# Patient Record
Sex: FEMALE | Race: WHITE
[De-identification: names, ages, dates, MRNs, and addresses within clinical notes are randomized per-mention and may not be internally consistent; named-entity substitution may affect disease eponyms.]

---

## 2018-01-11 ENCOUNTER — HOSPITAL ENCOUNTER (OUTPATIENT)
Dept: HOSPITAL 62 - OD | Age: 68
End: 2018-01-11
Attending: INTERNAL MEDICINE
Payer: MEDICARE

## 2018-01-11 DIAGNOSIS — R53.83: ICD-10-CM

## 2018-01-11 DIAGNOSIS — I10: Primary | ICD-10-CM

## 2018-01-11 DIAGNOSIS — E78.5: ICD-10-CM

## 2018-01-11 LAB
ADD MANUAL DIFF: NO
ALBUMIN SERPL-MCNC: 4.7 G/DL (ref 3.5–5)
ALP SERPL-CCNC: 101 U/L (ref 38–126)
ALT SERPL-CCNC: 45 U/L (ref 9–52)
ANION GAP SERPL CALC-SCNC: 17 MMOL/L (ref 5–19)
AST SERPL-CCNC: 43 U/L (ref 14–36)
BASOPHILS # BLD AUTO: 0.1 10^3/UL (ref 0–0.2)
BASOPHILS NFR BLD AUTO: 0.7 % (ref 0–2)
BILIRUB DIRECT SERPL-MCNC: 0.3 MG/DL (ref 0–0.4)
BILIRUB SERPL-MCNC: 0.7 MG/DL (ref 0.2–1.3)
BUN SERPL-MCNC: 22 MG/DL (ref 7–20)
CALCIUM: 10.7 MG/DL (ref 8.4–10.2)
CHLORIDE SERPL-SCNC: 99 MMOL/L (ref 98–107)
CHOLEST SERPL-MCNC: 252.92 MG/DL (ref 0–200)
CO2 SERPL-SCNC: 28 MMOL/L (ref 22–30)
EOSINOPHIL # BLD AUTO: 0.3 10^3/UL (ref 0–0.6)
EOSINOPHIL NFR BLD AUTO: 3.2 % (ref 0–6)
ERYTHROCYTE [DISTWIDTH] IN BLOOD BY AUTOMATED COUNT: 13.4 % (ref 11.5–14)
GLUCOSE SERPL-MCNC: 100 MG/DL (ref 75–110)
HCT VFR BLD CALC: 41.3 % (ref 36–47)
HGB BLD-MCNC: 14 G/DL (ref 12–15.5)
LDLC SERPL DIRECT ASSAY-MCNC: 149 MG/DL (ref ?–100)
LYMPHOCYTES # BLD AUTO: 2.4 10^3/UL (ref 0.5–4.7)
LYMPHOCYTES NFR BLD AUTO: 24.7 % (ref 13–45)
MCH RBC QN AUTO: 31 PG (ref 27–33.4)
MCHC RBC AUTO-ENTMCNC: 33.8 G/DL (ref 32–36)
MCV RBC AUTO: 92 FL (ref 80–97)
MONOCYTES # BLD AUTO: 0.6 10^3/UL (ref 0.1–1.4)
MONOCYTES NFR BLD AUTO: 5.9 % (ref 3–13)
NEUTROPHILS # BLD AUTO: 6.4 10^3/UL (ref 1.7–8.2)
NEUTS SEG NFR BLD AUTO: 65.5 % (ref 42–78)
PLATELET # BLD: 264 10^3/UL (ref 150–450)
POTASSIUM SERPL-SCNC: 4.2 MMOL/L (ref 3.6–5)
PROT SERPL-MCNC: 7.8 G/DL (ref 6.3–8.2)
RBC # BLD AUTO: 4.51 10^6/UL (ref 3.72–5.28)
SODIUM SERPL-SCNC: 144.2 MMOL/L (ref 137–145)
TOTAL CELLS COUNTED % (AUTO): 100 %
TRIGL SERPL-MCNC: 236 MG/DL (ref ?–150)
VLDLC SERPL CALC-MCNC: 47.2 MG/DL (ref 10–31)
WBC # BLD AUTO: 9.8 10^3/UL (ref 4–10.5)

## 2018-01-11 PROCEDURE — 85025 COMPLETE CBC W/AUTO DIFF WBC: CPT

## 2018-01-11 PROCEDURE — 80061 LIPID PANEL: CPT

## 2018-01-11 PROCEDURE — 36415 COLL VENOUS BLD VENIPUNCTURE: CPT

## 2018-01-11 PROCEDURE — 80053 COMPREHEN METABOLIC PANEL: CPT

## 2018-01-11 PROCEDURE — 84443 ASSAY THYROID STIM HORMONE: CPT

## 2018-01-26 ENCOUNTER — HOSPITAL ENCOUNTER (OUTPATIENT)
Dept: HOSPITAL 62 - RAD | Age: 68
End: 2018-01-26
Attending: INTERNAL MEDICINE
Payer: MEDICARE

## 2018-01-26 ENCOUNTER — HOSPITAL ENCOUNTER (OUTPATIENT)
Dept: HOSPITAL 62 - OD | Age: 68
End: 2018-01-26
Attending: INTERNAL MEDICINE
Payer: MEDICARE

## 2018-01-26 DIAGNOSIS — E83.51: Primary | ICD-10-CM

## 2018-01-26 DIAGNOSIS — R42: ICD-10-CM

## 2018-01-26 DIAGNOSIS — E21.3: Primary | ICD-10-CM

## 2018-01-26 DIAGNOSIS — H83.09: ICD-10-CM

## 2018-01-26 DIAGNOSIS — E88.81: ICD-10-CM

## 2018-01-26 PROCEDURE — 83970 ASSAY OF PARATHORMONE: CPT

## 2018-01-26 PROCEDURE — A9500 TC99M SESTAMIBI: HCPCS

## 2018-01-26 PROCEDURE — 36415 COLL VENOUS BLD VENIPUNCTURE: CPT

## 2018-01-26 PROCEDURE — 78070 PARATHYROID PLANAR IMAGING: CPT

## 2018-01-26 NOTE — RADIOLOGY REPORT (SQ)
EXAM DESCRIPTION:  NM PARATHYROID IMAGING



COMPLETED DATE/TIME:  1/26/2018 11:35 am



REASON FOR STUDY:  HYPERPARATHYROIDISM (E21.3) E21.3  HYPERPARATHYROIDISM, UNSPECIFIED



COMPARISON:  None.



RADIONUCLIDE AND DOSE:  18.56 millicuries Tc-99m Sestamibi.

The route of agent administration: Intravenous



ADDITIONAL DRUGS AND DOSES:  None.



TECHNIQUE:  Early and delayed images of the neck acquired following radionuclide administration.



LIMITATIONS:  None.



FINDINGS:  Thyroid: Normal size.  Homogeneous activity.  Normal washout.  No focal lesions.

Parathyroid: No retained activity in the thyroid or elsewhere in the neck to indicate a parathyroid a
denoma.

Other: No other significant findings.



IMPRESSION:  NORMAL STUDY.  NO EVIDENCE OF PARATHYROID ADENOMA.



TECHNICAL DOCUMENTATION:  JOB ID:  [id number]

 2011 [email or web address]- All Rights Reserved

## 2018-02-06 ENCOUNTER — HOSPITAL ENCOUNTER (OUTPATIENT)
Dept: HOSPITAL 62 - OD | Age: 68
End: 2018-02-06
Attending: FAMILY MEDICINE
Payer: MEDICARE

## 2018-02-06 DIAGNOSIS — E21.3: ICD-10-CM

## 2018-02-06 DIAGNOSIS — E55.9: Primary | ICD-10-CM

## 2018-02-06 PROCEDURE — 82306 VITAMIN D 25 HYDROXY: CPT

## 2018-02-06 PROCEDURE — 36415 COLL VENOUS BLD VENIPUNCTURE: CPT

## 2018-10-10 ENCOUNTER — HOSPITAL ENCOUNTER (OUTPATIENT)
Dept: HOSPITAL 62 - RAD | Age: 68
End: 2018-10-10
Attending: INTERNAL MEDICINE
Payer: MEDICARE

## 2018-10-10 DIAGNOSIS — I10: ICD-10-CM

## 2018-10-10 DIAGNOSIS — R07.9: Primary | ICD-10-CM

## 2018-10-10 DIAGNOSIS — E11.9: ICD-10-CM

## 2018-10-10 DIAGNOSIS — R06.09: ICD-10-CM

## 2018-10-10 PROCEDURE — A9500 TC99M SESTAMIBI: HCPCS

## 2018-10-10 PROCEDURE — 93017 CV STRESS TEST TRACING ONLY: CPT

## 2018-10-10 PROCEDURE — 78452 HT MUSCLE IMAGE SPECT MULT: CPT

## 2018-10-10 NOTE — DRAGON STRESS TEST REPORT
INTRAVENOUS LEXISCAN CARDIOLITE STRESS TEST USING SINGLE PHOTON EMMISION 
COMPUTERIZED TOMOGRAPHIC.



DATE OF PROCEDURE: October 10, 2018, 

INDICATION : Chest pain



CARDIAC RISK FACTORS: Diabetes, hypertension



RESTING EKG: Sinus rhythm without any significant baseline ST-T wave changes

STRESS EKG: No significant ST segment changes noted with LexiScan bolus 



REASON FOR TERMINATION: Protocol.





PROCEDURE REPORT: Baseline heart rate 74 beats per minute with blood pressure 
of 157/92.  Patient had no significant complaints.  

Patient was bolused with Lexiscan 0.4 mg intravenously followed by saline bolus.

Heart rate at 2 minutes post bolus 94 with a blood pressure of 183/88.  

3 minutes post bolus heart rate 96 with blood pressure of 181/87.  

No significant EKG changes were noted.  Patient had no significant complaints 
during the procedure or postprocedure. 





CONCLUSIONS: Normal EKG and hemodynamic response to IV LexiScan.







NUCLEAR DATA: 

At rest the patient was given 14.61 millicuries of technetium 99 sestamibi 
injected intravenously.  As per protocol rest gated SPECT images were obtained.
  

On day of stress test, the patient was given intravenous LexiScan at a dose of 
0.4 mg in 5 mL intravenously, followed by flush with normal saline.  
Subsequently the stress dose of 42.3 millicuries of technetium 99 sestamibi was 
injected intravenously.  As per protocol stress gated images were obtained.  



NUCLEAR INTERPRETATION: Both raw and processed data were used for 
interpretation.  Visual, qualitative, computer-generated quantitative data was 
used.  There was good myocardial uptake of technetium compound.  Motion 
artifact and soft tissue attenuations were noted.  Increased visceral uptake 
was noted.  Significant breast attenuation artifact was noted.  A small area of 
mild decreased uptake noted in the mid anterior wall, felt to be related to 
differences in breast attenuation artifact rather than to defect but clinical 
correlation requested.  Therefore no definitive areas of transient perfusion 
defect noted, No definitive areas of fixed perfusion defect or scars noted.



EKG gated imaging showed LV EF at 65 %, rest and stress gated EF similar 
visually.  T. I D. ratio was 1.13.  Lung heart ratio noted to be within normal 
limits 0.21.  No significant extracardiac and abnormal radiotracer activities 
were noted.  RV free wall uptake was noted to be WNL.



IMPRESSION: Also refer to comments under nuclear interpretation. Also test 
results needs to be interpreted in the context of pretest probability.





1.  No definitive areas of transient perfusion defect noted.  A small area of 
mild decreased uptake in the stress imaging was noted in the mid anterior wall, 
SDS of 1, cannot rule out mild ischemia but felt to be mostly artifactual.  May 
consider stress echo if clinically indicated.  Since SDS score is 1, overall 
risk is low.

2.  There is no definitive scintigraphic evidence of myocardial infarction/scar.

3.  EKG gated imaging shows left ventricular ejection fraction of approx. 65 %. 

4.  Clinical correlation requested as worse disease and or balanced ischemia 
could be missed. In approximately 10% of the cases Lexiscan may not cause 
adequate vasodilatory stress.



RECOMMENDATIONS: 

Aggressive risk factor modification and medical management.  Further evaluation 
may be needed if continued symptoms or other high risk indicators are noted on 
clinical evaluation.

Close cardiology follow-up is also recommended.

Clinical correlation with echocardiogram derived ejection fraction.

Inability to exercise by itself can lead to increased cardiovascular event 
risks.

Consider cardiology consultation and or follow-up if clinically indicated.

I am available for cardiology evaluation and consultation if requested by the 
primary care MD, unless patient already has a cardiologist.





Dr. ELIANE Sood.  MD [facility]

Board certified in cardiology and sleep medicine.

Board certified in nuclear cardiology, adult echocardiography.
RENETTA

## 2020-02-24 ENCOUNTER — HOSPITAL ENCOUNTER (OUTPATIENT)
Dept: HOSPITAL 62 - OD | Age: 70
End: 2020-02-24
Attending: INTERNAL MEDICINE
Payer: MEDICARE

## 2020-02-24 DIAGNOSIS — E78.5: ICD-10-CM

## 2020-02-24 DIAGNOSIS — N18.2: ICD-10-CM

## 2020-02-24 DIAGNOSIS — R73.9: ICD-10-CM

## 2020-02-24 DIAGNOSIS — I12.9: Primary | ICD-10-CM

## 2020-02-24 LAB
ADD MANUAL DIFF: NO
ALBUMIN SERPL-MCNC: 4.5 G/DL (ref 3.5–5)
ALP SERPL-CCNC: 86 U/L (ref 38–126)
ANION GAP SERPL CALC-SCNC: 10 MMOL/L (ref 5–19)
AST SERPL-CCNC: 38 U/L (ref 14–36)
BASOPHILS # BLD AUTO: 0 10^3/UL (ref 0–0.2)
BASOPHILS NFR BLD AUTO: 0.6 % (ref 0–2)
BILIRUB DIRECT SERPL-MCNC: 0.3 MG/DL (ref 0–0.4)
BILIRUB SERPL-MCNC: 0.7 MG/DL (ref 0.2–1.3)
BUN SERPL-MCNC: 25 MG/DL (ref 7–20)
CALCIUM: 9.7 MG/DL (ref 8.4–10.2)
CHLORIDE SERPL-SCNC: 98 MMOL/L (ref 98–107)
CHOLEST SERPL-MCNC: 208.34 MG/DL (ref 0–200)
CO2 SERPL-SCNC: 31 MMOL/L (ref 22–30)
EOSINOPHIL # BLD AUTO: 0.2 10^3/UL (ref 0–0.6)
EOSINOPHIL NFR BLD AUTO: 3.2 % (ref 0–6)
ERYTHROCYTE [DISTWIDTH] IN BLOOD BY AUTOMATED COUNT: 13.7 % (ref 11.5–14)
GLUCOSE SERPL-MCNC: 90 MG/DL (ref 75–110)
HCT VFR BLD CALC: 40.7 % (ref 36–47)
HGB BLD-MCNC: 13.7 G/DL (ref 12–15.5)
LDLC SERPL DIRECT ASSAY-MCNC: 119 MG/DL (ref ?–100)
LYMPHOCYTES # BLD AUTO: 1.5 10^3/UL (ref 0.5–4.7)
LYMPHOCYTES NFR BLD AUTO: 23.3 % (ref 13–45)
MCH RBC QN AUTO: 31 PG (ref 27–33.4)
MCHC RBC AUTO-ENTMCNC: 33.6 G/DL (ref 32–36)
MCV RBC AUTO: 92 FL (ref 80–97)
MONOCYTES # BLD AUTO: 0.5 10^3/UL (ref 0.1–1.4)
MONOCYTES NFR BLD AUTO: 7.5 % (ref 3–13)
NEUTROPHILS # BLD AUTO: 4.2 10^3/UL (ref 1.7–8.2)
NEUTS SEG NFR BLD AUTO: 65.4 % (ref 42–78)
PLATELET # BLD: 201 10^3/UL (ref 150–450)
POTASSIUM SERPL-SCNC: 4.2 MMOL/L (ref 3.6–5)
PROT SERPL-MCNC: 7.9 G/DL (ref 6.3–8.2)
RBC # BLD AUTO: 4.42 10^6/UL (ref 3.72–5.28)
TOTAL CELLS COUNTED % (AUTO): 100 %
TRIGL SERPL-MCNC: 133 MG/DL (ref ?–150)
VLDLC SERPL CALC-MCNC: 27 MG/DL (ref 10–31)
WBC # BLD AUTO: 6.5 10^3/UL (ref 4–10.5)

## 2020-02-24 PROCEDURE — 80061 LIPID PANEL: CPT

## 2020-02-24 PROCEDURE — 85025 COMPLETE CBC W/AUTO DIFF WBC: CPT

## 2020-02-24 PROCEDURE — 83036 HEMOGLOBIN GLYCOSYLATED A1C: CPT

## 2020-02-24 PROCEDURE — 84443 ASSAY THYROID STIM HORMONE: CPT

## 2020-02-24 PROCEDURE — 36415 COLL VENOUS BLD VENIPUNCTURE: CPT

## 2020-02-24 PROCEDURE — 80053 COMPREHEN METABOLIC PANEL: CPT
